# Patient Record
Sex: FEMALE | Race: WHITE | NOT HISPANIC OR LATINO | Employment: STUDENT | URBAN - METROPOLITAN AREA
[De-identification: names, ages, dates, MRNs, and addresses within clinical notes are randomized per-mention and may not be internally consistent; named-entity substitution may affect disease eponyms.]

---
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
## 2023-07-08 ENCOUNTER — HEALTH MAINTENANCE LETTER (OUTPATIENT)
Age: 18
End: 2023-07-08

## 2023-09-14 ENCOUNTER — TRANSFERRED RECORDS (OUTPATIENT)
Dept: HEALTH INFORMATION MANAGEMENT | Facility: CLINIC | Age: 18
End: 2023-09-14
Payer: COMMERCIAL

## 2023-10-04 ENCOUNTER — OFFICE VISIT (OUTPATIENT)
Dept: MIDWIFE SERVICES | Facility: CLINIC | Age: 18
End: 2023-10-04
Payer: COMMERCIAL

## 2023-10-04 VITALS — BODY MASS INDEX: 19.47 KG/M2 | DIASTOLIC BLOOD PRESSURE: 60 MMHG | SYSTOLIC BLOOD PRESSURE: 110 MMHG | WEIGHT: 129 LBS

## 2023-10-04 DIAGNOSIS — R39.15 URINARY URGENCY: Primary | ICD-10-CM

## 2023-10-04 DIAGNOSIS — N91.2 AMENORRHEA: ICD-10-CM

## 2023-10-04 LAB
CLUE CELLS: ABNORMAL
HCG UR QL: NEGATIVE
INTERNAL QC OK POCT: NORMAL
POCT KIT EXPIRATION DATE: NORMAL
POCT KIT LOT NUMBER: NORMAL
TRICHOMONAS, WET PREP: ABNORMAL
WBC'S/HIGH POWER FIELD, WET PREP: ABNORMAL
YEAST, WET PREP: ABNORMAL

## 2023-10-04 PROCEDURE — 87086 URINE CULTURE/COLONY COUNT: CPT | Performed by: ADVANCED PRACTICE MIDWIFE

## 2023-10-04 PROCEDURE — 81025 URINE PREGNANCY TEST: CPT | Performed by: ADVANCED PRACTICE MIDWIFE

## 2023-10-04 PROCEDURE — 87491 CHLMYD TRACH DNA AMP PROBE: CPT | Performed by: ADVANCED PRACTICE MIDWIFE

## 2023-10-04 PROCEDURE — 87591 N.GONORRHOEAE DNA AMP PROB: CPT | Performed by: ADVANCED PRACTICE MIDWIFE

## 2023-10-04 PROCEDURE — 87210 SMEAR WET MOUNT SALINE/INK: CPT | Performed by: ADVANCED PRACTICE MIDWIFE

## 2023-10-04 PROCEDURE — 99213 OFFICE O/P EST LOW 20 MIN: CPT | Performed by: ADVANCED PRACTICE MIDWIFE

## 2023-10-04 RX ORDER — HYDROXYZINE HYDROCHLORIDE 10 MG/1
1 TABLET, FILM COATED ORAL
COMMUNITY
Start: 2023-09-22

## 2023-10-04 NOTE — NURSING NOTE
"Chief Complaint   Patient presents with    UTI     Possible uti.  Has urgency then cannot go or only goes very little.  No pain, no burning.         Initial /60   Wt 58.5 kg (129 lb)   BMI 19.47 kg/m   Estimated body mass index is 19.47 kg/m  as calculated from the following:    Height as of 22: 1.734 m (5' 8.25\").    Weight as of this encounter: 58.5 kg (129 lb).  BP completed using cuff size: regular    Questioned patient about current smoking habits.  Pt. has never smoked.          Loni Cox LPN on 10/4/2023 at 9:47 AM                "

## 2023-10-04 NOTE — PROGRESS NOTES
SUBJECTIVE:                                                   Fabienne Sheehan is a 18 year old who presents to clinic today for the following health issue(s):  Patient presents with:  UTI: Possible uti.  Has urgency then cannot go or only goes very little.  No pain, no burning.        HPI:  Reports urinary urgency x 1 month. No itching or burning. Has an odor and heavy whitish yellow discharge. Abdominal pressure. Started eating disorder treatment in August. Has a history of PCOS.     No LMP recorded. (Menstrual status: Irregular Periods).  Menstrual History: irregular   Patient is not sexually active since middle August  .  Using condoms for contraception.   Health maintenance updated:  yes  STI infx testing offered:  Declined  PHQ-2 Score:         10/4/2023     9:47 AM 2021     9:07 AM   PHQ-2 (  [employer])   Q1: Little interest or pleasure in doing things 0 0   Q2: Feeling down, depressed or hopeless 0 0   PHQ-2 Score 0 0   PHQ-2 Total Score (12-17 Years)- Positive if 3 or more points; Administer PHQ-A if positive  0       Alcohol Score = Doesn't drink    Problem list and histories reviewed & adjusted, as indicated.  Additional history: as documented.    Patient Active Problem List   Diagnosis    History of concussion    Rupture of anterior cruciate ligament of left knee    Hip pain, right    Acne vulgaris    Polycystic ovarian syndrome     Past Surgical History:   Procedure Laterality Date    HIP SURGERY  2019    torn labrum      Social History     Tobacco Use    Smoking status: Never    Smokeless tobacco: Never   Substance Use Topics    Alcohol use: No     Alcohol/week: 0.0 standard drinks of alcohol      Problem (# of Occurrences) Relation (Name,Age of Onset)    Migraines (1) Mother    Other - See Comments (1) Mother: Bilat hip replacement    No Known Problems (7) Father, Maternal Grandmother, Maternal Grandfather, Paternal Grandmother, Paternal Grandfather, Brother, Brother               Current Outpatient Medications   Medication Sig    hydrOXYzine (ATARAX) 10 MG tablet Take 1 tablet by mouth 2 times daily (Patient not taking: Reported on 10/4/2023)     No current facility-administered medications for this visit.     No Known Allergies    ROS:  CONSTITUTIONAL: NEGATIVE for fever, chills, change in weight  INTEGUMENTARU/SKIN: NEGATIVE for worrisome rashes, moles or lesions  EYES: NEGATIVE for vision changes or irritation  ENT: NEGATIVE for ear, mouth and throat problems  RESP: NEGATIVE for significant cough or SOB  BREAST: NEGATIVE for masses, tenderness or discharge  CV: NEGATIVE for chest pain, palpitations or peripheral edema  GI: NEGATIVE for nausea, abdominal pain, heartburn, or change in bowel habits   female: menses: irregular, urgency, and whitish/yellowish discharge  MUSCULOSKELETAL: NEGATIVE for significant arthralgias or myalgia  NEURO: NEGATIVE for weakness, dizziness or paresthesias  PSYCHIATRIC: NEGATIVE for changes in mood or affect    OBJECTIVE:     /60   Wt 58.5 kg (129 lb)   BMI 19.47 kg/m    Body mass index is 19.47 kg/m .    PHYSICAL EXAM:  Constitutional:  Appearance: Well nourished, well developed alert, in no acute distress  Skin: General Inspection:  No rashes present, no lesions present, no areas of discoloration.  Neurologic:  Mental Status:  Oriented X3.  Normal strength and tone, sensory exam grossly normal, mentation intact and speech normal.    Psychiatric:  Mentation appears normal and affect normal/bright.  Pelvic Exam:  External Genitalia:     Normal appearance for age, no tenderness present, no inflammatory lesions present, color normal. Positive for thick white discharge  Vagina:     Normal vaginal vault without central or paravaginal defects, no discharge present, no inflammatory lesions present, no masses present  Bladder:     Nontender to palpation  Urethra:   Urethral Body:  Urethra palpation normal, urethra structural support normal   Urethral  Meatus:  No erythema or lesions present  Cervix:     Appearance healthy, no lesions present, nontender to palpation, no bleeding present  Uterus:     Uterus: firm, normal sized and nontender,   Adnexa:     No adnexal tenderness present, no adnexal masses present  Perineum:     Perineum within normal limits, no evidence of trauma, no rashes or skin lesions present  Anus:     Anus within normal limits, no hemorrhoids present  Inguinal Lymph Nodes:     No lymphadenopathy present  Pubic Hair:     Normal pubic hair distribution for age  Genitalia and Groin:     No rashes present, no lesions present, no areas of discoloration, no masses present     In-Clinic Test Results:  Results for orders placed or performed in visit on 10/04/23 (from the past 24 hour(s))   HCG qualitative urine POCT, Enter/Edit Result   Result Value Ref Range    HCG Qual Urine Negative Negative    Internal QC Check POCT Valid Valid    POCT Kit Lot Number [id number]     POCT Kit Expiration Date 07/25/2024        ASSESSMENT/PLAN:                                                        ICD-10-CM    1. Urinary urgency  R39.15 HCG qualitative urine POCT, Enter/Edit Result     NEISSERIA GONORRHOEA PCR     CHLAMYDIA TRACHOMATIS PCR     Urine Culture Aerobic Bacterial - lab collect     Wet prep - Clinic Collect      2. Amenorrhea  N91.2           PLAN:  Dicussed fertility (as pt would like to be pregnant at some point in her life)  Await results for treatment  If tests are negative, consider an US for abdominal pain.  LIZBETH Sagastume CNM

## 2023-10-05 LAB
C TRACH DNA SPEC QL NAA+PROBE: NEGATIVE
N GONORRHOEA DNA SPEC QL NAA+PROBE: NEGATIVE

## 2023-10-06 LAB — BACTERIA UR CULT: NO GROWTH

## 2023-10-26 ENCOUNTER — MYC MEDICAL ADVICE (OUTPATIENT)
Dept: MIDWIFE SERVICES | Facility: CLINIC | Age: 18
End: 2023-10-26
Payer: COMMERCIAL

## 2023-10-27 DIAGNOSIS — R39.15 URINARY URGENCY: Primary | ICD-10-CM

## 2023-10-27 NOTE — TELEPHONE ENCOUNTER
Please see mychart msg and advise. Pt still having urinary urgency despite negative tests and would like to know next steps.     Last OV: 10/4/23    BUBBA Langley

## 2024-05-23 ENCOUNTER — OFFICE VISIT (OUTPATIENT)
Dept: OBGYN | Facility: CLINIC | Age: 19
End: 2024-05-23
Payer: COMMERCIAL

## 2024-05-23 VITALS — BODY MASS INDEX: 20.07 KG/M2 | SYSTOLIC BLOOD PRESSURE: 90 MMHG | WEIGHT: 133 LBS | DIASTOLIC BLOOD PRESSURE: 62 MMHG

## 2024-05-23 DIAGNOSIS — Z12.4 PAP SMEAR FOR CERVICAL CANCER SCREENING: Primary | ICD-10-CM

## 2024-05-23 DIAGNOSIS — R10.2 PELVIC PAIN IN FEMALE: ICD-10-CM

## 2024-05-23 DIAGNOSIS — N92.6 IRREGULAR PERIODS: ICD-10-CM

## 2024-05-23 LAB
HBA1C MFR BLD: 4.2 % (ref 0–5.6)
HCG UR QL: NEGATIVE

## 2024-05-23 PROCEDURE — 81025 URINE PREGNANCY TEST: CPT | Performed by: OBSTETRICS & GYNECOLOGY

## 2024-05-23 PROCEDURE — 84146 ASSAY OF PROLACTIN: CPT | Performed by: OBSTETRICS & GYNECOLOGY

## 2024-05-23 PROCEDURE — 99459 PELVIC EXAMINATION: CPT | Performed by: OBSTETRICS & GYNECOLOGY

## 2024-05-23 PROCEDURE — G0145 SCR C/V CYTO,THINLAYER,RESCR: HCPCS | Performed by: OBSTETRICS & GYNECOLOGY

## 2024-05-23 PROCEDURE — G2211 COMPLEX E/M VISIT ADD ON: HCPCS | Performed by: OBSTETRICS & GYNECOLOGY

## 2024-05-23 PROCEDURE — 36415 COLL VENOUS BLD VENIPUNCTURE: CPT | Performed by: OBSTETRICS & GYNECOLOGY

## 2024-05-23 PROCEDURE — 99214 OFFICE O/P EST MOD 30 MIN: CPT | Performed by: OBSTETRICS & GYNECOLOGY

## 2024-05-23 PROCEDURE — 83036 HEMOGLOBIN GLYCOSYLATED A1C: CPT | Performed by: OBSTETRICS & GYNECOLOGY

## 2024-05-23 PROCEDURE — 84443 ASSAY THYROID STIM HORMONE: CPT | Performed by: OBSTETRICS & GYNECOLOGY

## 2024-05-23 NOTE — NURSING NOTE
"Chief Complaint   Patient presents with    Menstrual Problem     Irregular periods-vary in length-have gotten lighter, hot flashes-started in feb-mostly in afternoon, discomfort with laying on left side-thinks maybe ovarian cyst?       Initial BP 90/62   Wt 60.3 kg (133 lb)   LMP 2024   BMI 20.07 kg/m   Estimated body mass index is 20.07 kg/m  as calculated from the following:    Height as of 22: 1.734 m (5' 8.25\").    Weight as of this encounter: 60.3 kg (133 lb).  BP completed using cuff size: regular    Questioned patient about current smoking habits.  Pt. has never smoked.          The following HM Due: pap smear    "

## 2024-05-23 NOTE — PROGRESS NOTES
SUBJECTIVE:                                                     Fabienne Sheehan is a 19 year old female  who presents today for irregular periods, L sided pain, and questions about hormones and future fertility.    -Since Feb, hot flashes many afternoons.  -discomfort L side when lying down, crampy, deep. Hasn't gone away. Not present any other time. Occasional nausea with this.  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 Was coming monthly, but then no period since 2024. Small spotting once but nothing else.     Getting  at the end of the year, concerned about fertility.  Previous diagnosis of PCOS in , based on oligomenorrhea and US findings.  She was on oral contraceptive pills for a while, stopped those due to mood changes.      Problem list and histories reviewed & adjusted, as indicated.  Additional history: as documented.    Patient Active Problem List   Diagnosis    History of concussion    Rupture of anterior cruciate ligament of left knee    Hip pain, right    Acne vulgaris    Polycystic ovarian syndrome     Past Surgical History:   Procedure Laterality Date    HIP SURGERY  2019    torn labrum      Social History     Tobacco Use    Smoking status: Never    Smokeless tobacco: Never   Substance Use Topics    Alcohol use: No     Alcohol/week: 0.0 standard drinks of alcohol      Problem (# of Occurrences) Relation (Name,Age of Onset)    Migraines (1) Mother    Other - See Comments (1) Mother: Bilat hip replacement    No Known Problems (7) Father, Maternal Grandmother, Maternal Grandfather, Paternal Grandmother, Paternal Grandfather, Brother, Brother              Current Outpatient Medications   Medication Sig Dispense Refill    hydrOXYzine (ATARAX) 10 MG tablet Take 1 tablet by mouth 2 times daily      sertraline (ZOLOFT) 50 MG tablet        No current facility-administered medications for this visit.     No Known Allergies    ROS:  Negative other than as  noted in HPI.    OBJECTIVE:     Exam:  Constitutional:  Appearance: Well nourished, well developed alert, in no acute distress  Chest:  Respiratory Effort:  Breathing unlabored  Gastrointestinal:  Abdominal Examination:  Abdomen nontender to palpation, tone normal without rigidity or guarding, no masses present, umbilicus without lesions; Liver/Spleen:  No hepatomegaly present, liver nontender to palpation; Hernias:  No hernias present  Skin:General Inspection:  No rashes present, no lesions present, no areas of discoloration  Neurologic/Psychiatric:  Mental Status:  Oriented X3   Pelvic Exam:  External Genitalia:     Normal appearance for age, no discharge present, no tenderness present, no inflammatory lesions present, color normal  Vagina:     Normal vaginal vault without central or paravaginal defects, no discharge present, no inflammatory lesions present, no masses present  Bladder:     Nontender to palpation  Urethra:   Urethral Body:  Urethra palpation normal, urethra structural support normal   Urethral Meatus:  No erythema or lesions present  Cervix:     Appearance healthy, no lesions present, nontender to palpation, no bleeding present.. Pap obtained.  Uterus:     Uterus: firm, normal sized and nontender, anteverted in position.   Adnexa:     No adnexal tenderness present, no adnexal masses present  Perineum:     Perineum within normal limits, no evidence of trauma, no rashes or skin lesions present  Anus:     Anus within normal limits, no hemorrhoids present  Inguinal Lymph Nodes:     No lymphadenopathy present  Pubic Hair:     Normal pubic hair distribution for age  Genitalia and Groin:     No rashes present, no lesions present, no areas of discoloration, no masses present      In-Clinic Test Results:  Results for orders placed or performed in visit on 05/23/24 (from the past 24 hour(s))   Hemoglobin A1c   Result Value Ref Range    Hemoglobin A1C 4.2 0.0 - 5.6 %   HCG qualitative urine   Result Value Ref  Range    hCG Urine Qualitative Negative Negative       ASSESSMENT/PLAN:                                                        ICD-10-CM    1. Pap smear for cervical cancer screening  Z12.4 Pap Screen Only - Recommended Age 21 - 24 Years      2. Irregular periods  N92.6 Prolactin     TSH with free T4 reflex     Hemoglobin A1c     HCG qualitative urine     US Pelvic Transabdominal and Transvaginal     Prolactin     TSH with free T4 reflex     Hemoglobin A1c     HCG qualitative urine      3. Pelvic pain in female  R10.2 US Pelvic Transabdominal and Transvaginal            -Pap smear obtained today, results via [facility].  If normal, rescreen in 3 years.    -Discussed again her irregular cycles.  In the past, this was thought to be secondary to PCOS, but obviously history of an eating disorder complicates things as hypothalamic amenorrhea may also be the cause.  She is currently in recovery, weight has stabilized.  We discussed checking prolactin and TSH hCG and hemoglobin A1c today.  Will also schedule a pelvic ultrasound due to left-sided pain.  If these are normal and she has not had a period in the intervening timeframe, will use Provera 10 mg daily for 10 days to see if she gets a withdrawal bleed and proceed from there with timed labs.    -Left-sided pelvic pain, only when lying down on the left side.  Will obtain a pelvic ultrasound to rule out ovarian pathology, and if this is normal, consider referral to pelvic floor physical therapy.    The longitudinal plan of care for the diagnosis(es)/condition(s) as documented were addressed during this visit. Due to the added complexity in care, I will continue to support Fabienne in the subsequent management and with ongoing continuity of care.    Carley Perry MD  [facility]

## 2024-05-24 LAB
PROLACTIN SERPL 3RD IS-MCNC: 15 NG/ML (ref 5–23)
TSH SERPL DL<=0.005 MIU/L-ACNC: 0.71 UIU/ML (ref 0.3–4.2)

## 2024-05-30 LAB
BKR LAB AP GYN ADEQUACY: NORMAL
BKR LAB AP GYN INTERPRETATION: NORMAL
BKR LAB AP HPV REFLEX: NO
BKR LAB AP LMP: NORMAL
BKR LAB AP PREVIOUS ABNORMAL: NORMAL
PATH REPORT.COMMENTS IMP SPEC: NORMAL
PATH REPORT.COMMENTS IMP SPEC: NORMAL
PATH REPORT.RELEVANT HX SPEC: NORMAL

## 2024-06-03 ENCOUNTER — ANCILLARY PROCEDURE (OUTPATIENT)
Dept: ULTRASOUND IMAGING | Facility: CLINIC | Age: 19
End: 2024-06-03
Attending: OBSTETRICS & GYNECOLOGY
Payer: COMMERCIAL

## 2024-06-03 DIAGNOSIS — N92.6 IRREGULAR PERIODS: ICD-10-CM

## 2024-06-03 DIAGNOSIS — R10.2 PELVIC PAIN IN FEMALE: ICD-10-CM

## 2024-06-03 PROCEDURE — 76856 US EXAM PELVIC COMPLETE: CPT | Performed by: FAMILY MEDICINE

## 2024-06-03 PROCEDURE — 76830 TRANSVAGINAL US NON-OB: CPT | Performed by: FAMILY MEDICINE

## 2024-06-27 ENCOUNTER — OFFICE VISIT (OUTPATIENT)
Dept: OBGYN | Facility: CLINIC | Age: 19
End: 2024-06-27
Payer: COMMERCIAL

## 2024-06-27 VITALS
DIASTOLIC BLOOD PRESSURE: 60 MMHG | SYSTOLIC BLOOD PRESSURE: 90 MMHG | WEIGHT: 133 LBS | HEIGHT: 68 IN | BODY MASS INDEX: 20.16 KG/M2

## 2024-06-27 DIAGNOSIS — N92.6 IRREGULAR PERIODS: Primary | ICD-10-CM

## 2024-06-27 DIAGNOSIS — E28.2 PCOS (POLYCYSTIC OVARIAN SYNDROME): ICD-10-CM

## 2024-06-27 DIAGNOSIS — N91.2 AMENORRHEA: ICD-10-CM

## 2024-06-27 PROCEDURE — 99214 OFFICE O/P EST MOD 30 MIN: CPT | Performed by: OBSTETRICS & GYNECOLOGY

## 2024-06-27 RX ORDER — MEDROXYPROGESTERONE ACETATE 10 MG
10 TABLET ORAL DAILY
Qty: 10 TABLET | Refills: 0 | Status: SHIPPED | OUTPATIENT
Start: 2024-06-27

## 2024-06-27 NOTE — NURSING NOTE
"Chief Complaint   Patient presents with    Follow Up     U/S 6/3       Initial BP 90/60 (BP Location: Right arm, Patient Position: Chair, Cuff Size: Adult Regular)   Ht 1.727 m (5' 8\")   Wt 60.3 kg (133 lb)   LMP 2024 (Exact Date)   Breastfeeding No   BMI 20.22 kg/m   Estimated body mass index is 20.22 kg/m  as calculated from the following:    Height as of this encounter: 1.727 m (5' 8\").    Weight as of this encounter: 60.3 kg (133 lb).  BP completed using cuff size: regular    Questioned patient about current smoking habits.  Pt. has never smoked.          The following HM Due: NONE  Marina Aguayo CMA    "

## 2024-06-27 NOTE — PROGRESS NOTES
SUBJECTIVE:                                                     Fabienne Sheehan is a 19 year old female  who presents today for PCOS and irregular periods. See last note.    US reviewed: Shows evidence of PCOS. Labs within normal limits.  Would like to review next steps, and is interested in discussing fertility.  Ideally, would like to consider conceiving in the next year or 2.        Problem list and histories reviewed & adjusted, as indicated.  Additional history: as documented.    Patient Active Problem List   Diagnosis    History of concussion    Rupture of anterior cruciate ligament of left knee    Hip pain, right    Acne vulgaris    Polycystic ovarian syndrome     Past Surgical History:   Procedure Laterality Date    HIP SURGERY  2019    torn labrum      Social History     Tobacco Use    Smoking status: Never    Smokeless tobacco: Never   Substance Use Topics    Alcohol use: No     Alcohol/week: 0.0 standard drinks of alcohol      Problem (# of Occurrences) Relation (Name,Age of Onset)    Migraines (1) Mother    Other - See Comments (1) Mother: Bilat hip replacement    No Known Problems (7) Father, Maternal Grandmother, Maternal Grandfather, Paternal Grandmother, Paternal Grandfather, Brother, Brother              Current Outpatient Medications   Medication Sig Dispense Refill    hydrOXYzine (ATARAX) 10 MG tablet Take 1 tablet by mouth 2 times daily      medroxyPROGESTERone (PROVERA) 10 MG tablet Take 1 tablet (10 mg) by mouth daily 10 tablet 0    sertraline (ZOLOFT) 50 MG tablet        No current facility-administered medications for this visit.     No Known Allergies    ROS:  Negative other than as noted in HPI.    OBJECTIVE:     Exam:  Constitutional:  Appearance: Well nourished, well developed alert, in no acute distress  Neurologic/Psychiatric:  Mental Status:  Oriented X3       In-Clinic Test Results:  No results found for this or any previous visit (from the past 24  hour(s)).    ASSESSMENT/PLAN:                                                        ICD-10-CM    1. Irregular periods  N92.6       2. Amenorrhea  N91.2 medroxyPROGESTERone (PROVERA) 10 MG tablet     Follicle stimulating hormone     Estradiol      3. PCOS (polycystic ovarian syndrome)  E28.2             -Discussed polycystic ovarian syndrome. Explained that polycystic ovary syndrome (PCOS) is an important cause of both menstrual irregularity and androgen excess in women. Discussed that it can cause irregular menstrual cycles, hirsutism, obesity, insulin resistance, and anovulatory infertility. Specifically explained that due to PCOS she may be at increased risk of type 2 diabetes, cardiovascular disease, endometrial hyperplasia, endometrial cancer, and infertility.   Discussed that oral contraceptives (OCs) are the mainstay of pharmacologic therapy for women with PCOS for managing hyperandrogenism, treating menstrual dysfunction and protecting endometrial lining. Additionally, these provide contraception.   For women with PCOS who choose not to or cannot take OCs, we discussed alternative treatments for endometrial protection including intermittent or continuous progestin therapy, or a progestin-releasing intrauterine device (IUD). Depo provera, Nuvaring, the patch, and Nexplanon may also provide this protection in addition to contraception.  -Given her history of an eating disorder, it is also possible that there is an component of hypothalamic hypogonadotropic amenorrhea.  In order to elucidate the difference and know how to best treat her, I have recommended a progesterone withdrawal.  She is interested in this and Provera was sent.  If she has a period after using Provera, will check day 3 labs, which were ordered today.  She can then have the option of starting oral contraceptives as above, or using Provera to withdrawal every 45 to 60 days if she does not get a period, in order to protect the endometrial  lining.  This was discussed in detail today and would not require a follow-up visit unless she had questions.    If she does not get a withdrawal bleed, would then proceed to give her estrogen and Provera and I would give her the instructions to use this.  If she got a period with this regimen, this is most likely hypothalamic amenorrhea, and she is at risk for bone loss.  I would then need to schedule a visit with her to discuss options for estrogen replacement.      Carley Perry MD  [facility]

## 2024-06-30 ENCOUNTER — LAB (OUTPATIENT)
Dept: LAB | Facility: CLINIC | Age: 19
End: 2024-06-30
Payer: COMMERCIAL

## 2024-06-30 DIAGNOSIS — N91.2 AMENORRHEA: ICD-10-CM

## 2024-06-30 LAB
ESTRADIOL SERPL-MCNC: 36 PG/ML
FSH SERPL IRP2-ACNC: 5.9 MIU/ML

## 2024-06-30 PROCEDURE — 82670 ASSAY OF TOTAL ESTRADIOL: CPT

## 2024-06-30 PROCEDURE — 36415 COLL VENOUS BLD VENIPUNCTURE: CPT

## 2024-06-30 PROCEDURE — 83001 ASSAY OF GONADOTROPIN (FSH): CPT

## 2024-08-13 ENCOUNTER — OFFICE VISIT (OUTPATIENT)
Dept: URGENT CARE | Facility: URGENT CARE | Age: 19
End: 2024-08-13
Payer: COMMERCIAL

## 2024-08-13 VITALS
DIASTOLIC BLOOD PRESSURE: 60 MMHG | TEMPERATURE: 98.6 F | SYSTOLIC BLOOD PRESSURE: 90 MMHG | HEART RATE: 75 BPM | WEIGHT: 130 LBS | OXYGEN SATURATION: 97 % | BODY MASS INDEX: 19.77 KG/M2

## 2024-08-13 DIAGNOSIS — R39.15 URINARY URGENCY: Primary | ICD-10-CM

## 2024-08-13 LAB
ALBUMIN UR-MCNC: NEGATIVE MG/DL
APPEARANCE UR: CLEAR
BILIRUB UR QL STRIP: NEGATIVE
CLUE CELLS: ABNORMAL
COLOR UR AUTO: YELLOW
GLUCOSE UR STRIP-MCNC: NEGATIVE MG/DL
HGB UR QL STRIP: NEGATIVE
KETONES UR STRIP-MCNC: NEGATIVE MG/DL
LEUKOCYTE ESTERASE UR QL STRIP: NEGATIVE
NITRATE UR QL: NEGATIVE
PH UR STRIP: 6.5 [PH] (ref 5–7)
SP GR UR STRIP: 1.02 (ref 1–1.03)
TRICHOMONAS, WET PREP: ABNORMAL
UROBILINOGEN UR STRIP-ACNC: 1 E.U./DL
WBC'S/HIGH POWER FIELD, WET PREP: ABNORMAL
YEAST, WET PREP: ABNORMAL

## 2024-08-13 PROCEDURE — 99203 OFFICE O/P NEW LOW 30 MIN: CPT | Performed by: PHYSICIAN ASSISTANT

## 2024-08-13 PROCEDURE — 81003 URINALYSIS AUTO W/O SCOPE: CPT

## 2024-08-13 PROCEDURE — 87210 SMEAR WET MOUNT SALINE/INK: CPT

## 2024-08-14 NOTE — PATIENT INSTRUCTIONS
Try Azo available over the counter for symptomatic relief    Make sure to push fluids    Probiotics also recommended  
Yes

## 2024-08-14 NOTE — PROGRESS NOTES
Assessment & Plan     Urinary urgency  Acute problem.  Onset this morning.  On exam patient is in no acute distress.  Vitals are stable.  Urinalysis today is unremarkable.  No evidence of UTI.  Wet prep is negative.  Recommended Azo OTC for symptomatic relief.  Recommended to push fluids.  Probiotics.  Advised to keep monitoring symptoms.  Follow-up if any worsening symptoms.  Patient agrees with the plan.  - UA Macroscopic with reflex to Microscopic and Culture - Lab Collect  - Wet prep - lab collect       Return in about 1 week (around 8/20/2024) for Symptoms failing to improve.    Maryjane Craig PA-C  [facility]RG[facility]     Fabienne is a 19 year old female who presents to clinic today for the following health issues:  Chief Complaint   Patient presents with    Urgent Care     This morning urgency, not able to urinate sometimes, urinating small amounts, getting worse, bloated, vaginal itching, odor,   No meds taking for this        HPI    Patient with medical history significant for PCOS is presenting to urgent care today with a complaint of urinary urgency, urinary hesitancy, slight vaginal itching since this morning.  She notes she has felt warm this afternoon.  No recorded fevers.  No vomiting or diarrhea.  No abnormal vaginal discharge.  No abdominal pain.   Patient denies any concerns for STDs.      Review of Systems  Constitutional, HEENT, cardiovascular, pulmonary, GI, , musculoskeletal, neuro, skin, endocrine and psych systems are negative, except as otherwise noted.      Objective    BP 90/60   Pulse 75   Temp 98.6  F (37  C) (Tympanic)   Wt 59 kg (130 lb)   LMP 03/20/2024 (Exact Date)   SpO2 97%   BMI 19.77 kg/m    Physical Exam   GENERAL: alert and no distress  RESP: lungs clear to auscultation - no rales, rhonchi or wheezes  CV: regular rate and rhythm, normal S1 S2  ABDOMEN: soft, nontender, no hepatosplenomegaly, no masses and bowel sounds normal  MS: no  gross musculoskeletal defects noted, no edema    Results for orders placed or performed in visit on 08/13/24 (from the past 24 hour(s))   UA Macroscopic with reflex to Microscopic and Culture - Lab Collect    Specimen: Urine, Midstream   Result Value Ref Range    Color Urine Yellow Colorless, Straw, Light Yellow, Yellow    Appearance Urine Clear Clear    Glucose Urine Negative Negative mg/dL    Bilirubin Urine Negative Negative    Ketones Urine Negative Negative mg/dL    Specific Gravity Urine 1.020 1.003 - 1.035    Blood Urine Negative Negative    pH Urine 6.5 5.0 - 7.0    Protein Albumin Urine Negative Negative mg/dL    Urobilinogen Urine 1.0 0.2, 1.0 E.U./dL    Nitrite Urine Negative Negative    Leukocyte Esterase Urine Negative Negative    Narrative    Microscopic not indicated   Wet prep - lab collect    Specimen: Vagina; Swab   Result Value Ref Range    Trichomonas Absent Absent    Yeast Absent Absent    Clue Cells Absent Absent    WBCs/high power field 1+ (A) None

## 2024-08-31 ENCOUNTER — HEALTH MAINTENANCE LETTER (OUTPATIENT)
Age: 19
End: 2024-08-31

## 2024-10-29 ENCOUNTER — LAB (OUTPATIENT)
Dept: LAB | Facility: CLINIC | Age: 19
End: 2024-10-29
Payer: COMMERCIAL

## 2024-10-29 ENCOUNTER — MYC MEDICAL ADVICE (OUTPATIENT)
Dept: OBGYN | Facility: CLINIC | Age: 19
End: 2024-10-29

## 2024-10-29 ENCOUNTER — ANCILLARY PROCEDURE (OUTPATIENT)
Dept: ULTRASOUND IMAGING | Facility: CLINIC | Age: 19
End: 2024-10-29
Attending: FAMILY MEDICINE
Payer: COMMERCIAL

## 2024-10-29 DIAGNOSIS — R10.2 PELVIC PAIN IN FEMALE: ICD-10-CM

## 2024-10-29 DIAGNOSIS — R10.2 PELVIC PAIN IN FEMALE: Primary | ICD-10-CM

## 2024-10-29 LAB — HCG SERPL QL: NEGATIVE

## 2024-10-29 PROCEDURE — 84703 CHORIONIC GONADOTROPIN ASSAY: CPT

## 2024-10-29 PROCEDURE — 76856 US EXAM PELVIC COMPLETE: CPT | Performed by: OBSTETRICS & GYNECOLOGY

## 2024-10-29 PROCEDURE — 76830 TRANSVAGINAL US NON-OB: CPT | Performed by: OBSTETRICS & GYNECOLOGY

## 2024-10-29 PROCEDURE — 36415 COLL VENOUS BLD VENIPUNCTURE: CPT

## 2024-10-29 NOTE — TELEPHONE ENCOUNTER
Spoke to pt.  LMP 9/24/24. She has hx of irregular periods (PCOS).   Not on birth control but uses condoms.     Pelvic pain since last week.  Had one night of severe cramping. Lasted only a few minutes. Dull ache since.  Pain spiked again last night, but otherwise dull.  No vaginal bleeding.    Advised to take HPT today, call if positive.  scheduled for pelvic U/s today and f/up tomorrow.    Gave instruction on when to go to ED.       Radha VALDEZ RN BSN  [facility]

## 2024-10-30 ENCOUNTER — OFFICE VISIT (OUTPATIENT)
Dept: OBGYN | Facility: CLINIC | Age: 19
End: 2024-10-30
Payer: COMMERCIAL

## 2024-10-30 VITALS
BODY MASS INDEX: 20.63 KG/M2 | WEIGHT: 136.1 LBS | SYSTOLIC BLOOD PRESSURE: 90 MMHG | DIASTOLIC BLOOD PRESSURE: 60 MMHG | HEIGHT: 68 IN

## 2024-10-30 DIAGNOSIS — E28.2 PCOS (POLYCYSTIC OVARIAN SYNDROME): ICD-10-CM

## 2024-10-30 DIAGNOSIS — N83.209 RUPTURED OVARIAN CYST: Primary | ICD-10-CM

## 2024-10-30 PROCEDURE — 36415 COLL VENOUS BLD VENIPUNCTURE: CPT | Performed by: FAMILY MEDICINE

## 2024-10-30 PROCEDURE — 84403 ASSAY OF TOTAL TESTOSTERONE: CPT | Performed by: FAMILY MEDICINE

## 2024-10-30 PROCEDURE — 99214 OFFICE O/P EST MOD 30 MIN: CPT | Performed by: FAMILY MEDICINE

## 2024-10-30 PROCEDURE — 83001 ASSAY OF GONADOTROPIN (FSH): CPT | Performed by: FAMILY MEDICINE

## 2024-10-30 PROCEDURE — 84144 ASSAY OF PROGESTERONE: CPT | Performed by: FAMILY MEDICINE

## 2024-10-30 PROCEDURE — 82627 DEHYDROEPIANDROSTERONE: CPT | Performed by: FAMILY MEDICINE

## 2024-10-30 PROCEDURE — 82166 ASSAY ANTI-MULLERIAN HORM: CPT | Performed by: FAMILY MEDICINE

## 2024-10-30 PROCEDURE — 83002 ASSAY OF GONADOTROPIN (LH): CPT | Performed by: FAMILY MEDICINE

## 2024-10-30 PROCEDURE — 99000 SPECIMEN HANDLING OFFICE-LAB: CPT | Performed by: FAMILY MEDICINE

## 2024-10-30 PROCEDURE — 82670 ASSAY OF TOTAL ESTRADIOL: CPT | Performed by: FAMILY MEDICINE

## 2024-10-30 PROCEDURE — 82157 ASSAY OF ANDROSTENEDIONE: CPT | Mod: 90 | Performed by: FAMILY MEDICINE

## 2024-10-30 RX ORDER — KETOROLAC TROMETHAMINE 10 MG/1
10 TABLET, FILM COATED ORAL EVERY 6 HOURS PRN
Qty: 20 TABLET | Refills: 0 | Status: SHIPPED | OUTPATIENT
Start: 2024-10-30

## 2024-10-30 RX ORDER — OXYCODONE HCL 5 MG/5 ML
5 SOLUTION, ORAL ORAL 2 TIMES DAILY PRN
Qty: 60 ML | Refills: 0 | Status: CANCELLED | OUTPATIENT
Start: 2024-10-30

## 2024-10-30 NOTE — PROGRESS NOTES
SUBJECTIVE:  Fabienne Sheehan is an 19 year old   woman who presents for   gynecology consult for new onset pelvic pain, hx of PCOS, had never felt this pain before,   Us showing fluid in posterior cul-de-sac, likely from ruptured ovarian cyst.  Pregnancy test was negative.    No LMP recorded. (Menstrual status: Irregular Periods). Periods are regular q 4-6 weeks, lasting   4 days. Menarche @ age teenager, Dysmenorrhea:none. Cyclic symptoms   include none. No intermenstrual bleeding,   spotting, or discharge.    Current contraception: condoms  History of abnormal Pap smear: No  Family history of uterine or ovarian cancer: No  History of abnormal mammogram: No  Family history of breast cancer: Yes: M Aunt        Past Medical History:   Diagnosis Date    Clavicle fracture     Concussion with loss of consciousness     Approx 18 mos    Constipation      Miralax;  Enulose    Sever's disease     Right  foot          Family History   Problem Relation Age of Onset    Other - See Comments Mother         Bilat hip replacement    Migraines Mother     No Known Problems Father     No Known Problems Maternal Grandmother     No Known Problems Maternal Grandfather     No Known Problems Paternal Grandmother     No Known Problems Paternal Grandfather     No Known Problems Brother     No Known Problems Brother        Past Surgical History:   Procedure Laterality Date    HIP SURGERY  2019    torn labrum       Current Outpatient Medications   Medication Sig Dispense Refill    hydrOXYzine (ATARAX) 10 MG tablet Take 1 tablet by mouth 2 times daily (Patient not taking: Reported on 10/30/2024)      medroxyPROGESTERone (PROVERA) 10 MG tablet Take 1 tablet (10 mg) by mouth daily (Patient not taking: Reported on 2024) 10 tablet 0    sertraline (ZOLOFT) 50 MG tablet  (Patient not taking: Reported on 10/30/2024)       No current facility-administered medications for this visit.     No Known Allergies    Social  "History     Tobacco Use    Smoking status: Never    Smokeless tobacco: Never   Substance Use Topics    Alcohol use: No     Alcohol/week: 0.0 standard drinks of alcohol       Review Of Systems  Ears/Nose/Throat: negative  Respiratory: No shortness of breath, dyspnea on exertion, cough, or hemoptysis  Cardiovascular: negative  Gastrointestinal: negative  Genitourinary: See HPI   Constitutional, HEENT, cardiovascular, pulmonary, GI, , musculoskeletal, neuro, skin, endocrine and psych systems are negative, except as otherwise noted.    OBJECTIVE:  BP 90/60   Ht 1.727 m (5' 8\")   Wt 61.7 kg (136 lb 1.6 oz)   BMI 20.69 kg/m    General appearance: healthy, alert, and no distress  Skin: Skin color, texture, turgor normal. No rashes or lesions.  Ears: negative  Nose/Sinuses: Nares normal. Septum midline. Mucosa normal. No drainage or sinus tenderness.  Oropharynx: Lips, mucosa, and tongue normal. Teeth and gums normal.  Neck: Neck supple. No adenopathy. Thyroid symmetric, normal size,, Carotids without bruits.  Lungs: negative, Percussion normal. Good diaphragmatic excursion. Lungs clear  Heart: negative, PMI normal. No lifts, heaves, or thrills. RRR. No murmurs, clicks gallops or rub  Breasts: deferred  Abdomen: Abdomen soft, non-tender. BS normal. No masses, organomegaly    ASSESSMENT:  Fabienne Sheehan is an 19 year old   woman who presents for   gynecology consult for new onset pelvic pain, hx of PCOS, had never felt this pain before,   Us showing fluid in posterior cul-de-sac, likely from ruptured ovarian cyst.    Pain started about a week ago and hasn't resolved.     PLAN:  Dx:  1)  Pelvic pain, sudden onset with hemorrhagic fluid in posterior cul-de-sac:  ruptured ovarian cyst   Discussed observation, treatment with  pain medication and laparoscopy,   If no resolution by 3 weeks, recommend diagnostic laparoscopy      2) PCOS on ultrasound with irregular cycles:  Discussed fertility planning, planning to "   Become pregnant in the next few months. Could try luis-inositol.   Discussed clomid/femara, IUI as needed        Labs were reviewed in Epic   Imaging was reviewed in Epic   Tests and documents were reviewed.   Discussion of management or test interpretation   Diagnosis or treatment significantly limited by social determinant          Dr. Ernestine Terry,     Obstetrics and Gynecology  [facility] [place]

## 2024-10-30 NOTE — NURSING NOTE
"Chief Complaint   Patient presents with    Pelvic Pain     Bad pain this week--shooting pain then dull ache--flares with sharp pain       Initial BP 90/60   Ht 1.727 m (5' 8\")   Wt 61.7 kg (136 lb 1.6 oz)   BMI 20.69 kg/m   Estimated body mass index is 20.69 kg/m  as calculated from the following:    Height as of this encounter: 1.727 m (5' 8\").    Weight as of this encounter: 61.7 kg (136 lb 1.6 oz).  BP completed using cuff size: regular    Questioned patient about current smoking habits.  Pt. has never smoked.          The following HM Due: NONE    "

## 2024-10-30 NOTE — PATIENT INSTRUCTIONS
Ricky-inositol- PCOS     It start with the egg     Labs today     Dr. Ernestine Terry, DO    Obstetrics and Gynecology  [facility] - [place] and [place]

## 2024-10-31 LAB
DHEA-S SERPL-MCNC: 359 UG/DL (ref 35–430)
ESTRADIOL SERPL-MCNC: 23 PG/ML
FSH SERPL IRP2-ACNC: 5.8 MIU/ML
LH SERPL-ACNC: 7.4 MIU/ML
MIS SERPL-MCNC: 7.7 NG/ML (ref 1.2–12)
PROGEST SERPL-MCNC: 0.3 NG/ML

## 2024-11-01 LAB — TESTOST SERPL-MCNC: 20 NG/DL (ref 8–60)

## 2024-11-03 LAB — ANDROST SERPL-MCNC: 0.92 NG/ML

## 2025-02-17 ENCOUNTER — VIRTUAL VISIT (OUTPATIENT)
Dept: OBGYN | Facility: CLINIC | Age: 20
End: 2025-02-17
Payer: COMMERCIAL

## 2025-02-17 DIAGNOSIS — Z34.01 ENCOUNTER FOR SUPERVISION OF NORMAL FIRST PREGNANCY IN FIRST TRIMESTER: Primary | ICD-10-CM

## 2025-02-17 DIAGNOSIS — Z32.01 PREGNANCY TEST POSITIVE: ICD-10-CM

## 2025-02-17 PROCEDURE — 99207 PR NO CHARGE NURSE ONLY: CPT

## 2025-02-17 RX ORDER — PRENATAL VIT/IRON FUM/FOLIC AC 27MG-0.8MG
1 TABLET ORAL DAILY
Qty: 90 TABLET | Status: SHIPPED
Start: 2025-02-17

## 2025-02-17 NOTE — PROGRESS NOTES
"Chief Complaint   Patient presents with    Prenatal Care     New Prenatal Nurse Telephone Visit       Initial LMP 2025 (Exact Date)  Estimated body mass index is 20.69 kg/m  as calculated from the following:    Height as of 10/30/24: 1.727 m (5' 8\").    Weight as of 10/30/24: 61.7 kg (136 lb 1.6 oz).  BP completed using cuff size: NA (Not Taken)    Questioned patient about current smoking habits.  Pt. has never smoked.          NPN nurse visit done over the phone. Pt will be given NPN folder and book at her upcoming appt.  Discussed optional screening available to assess chromosomal anomalies. Questions answered. Informed pt of the clinic structure (on-call does deliveries for the day, may be male or female doctor). Pt advised to call the clinic if she has any questions or concerns related to her pregnancy. Prenatal labs will be obtained at her upcoming appt. New prenatal visit scheduled on 3/12/252 with   Dr Moon/but will be seeing Dr Perry in the fuure.    6w0d    Menstrual cycles: 27-46 days  Date of positive pregnancy test: 25  Medications stopped upon pos HPT: none    Last pap: 24        Patient supplied answers from flow sheet for:  Prenatal OB Questionnaire.  Past Medical History  Have you ever recieved care for your mental health? : (Patient-Rptd) No  Have you ever been in a major accident or suffered serious trauma?: (Patient-Rptd) No  Within the last year, has anyone hit, slapped, kicked or otherwise hurt you?: (Patient-Rptd) No  In the last year, has anyone forced you to have sex when you didn't want to?: (Patient-Rptd) No    Past Medical History 2   Have you ever received a blood transfusion?: (Patient-Rptd) No  Would you accept a blood transfusion if was medically recommended?: Yes  Does anyone in your home smoke?: (Patient-Rptd) No   Is your blood type Rh negative?: (Patient-Rptd) No  Have you ever ?: (Patient-Rptd) No  Have you been hospitalized for a nonsurgical " reason excluding normal delivery?: (Patient-Rptd) No  Have you ever had an abnormal pap smear?: (Patient-Rptd) No    Past Medical History (Continued)  Do you have a history of abnormalities of the uterus?: (Patient-Rptd) No  Did your mother take ALEXANDRA or any other hormones when she was pregnant with you?: (Patient-Rptd) No  Do you have any other problems we have not asked about which you feel may be important to this pregnancy?: (Patient-Rptd) No     Mahnaz Moore RN

## 2025-02-23 LAB
ABO + RH BLD: NORMAL
BLD GP AB SCN SERPL QL: NEGATIVE
SPECIMEN EXP DATE BLD: NORMAL

## 2025-02-24 ENCOUNTER — TELEPHONE (OUTPATIENT)
Dept: OBGYN | Facility: CLINIC | Age: 20
End: 2025-02-24

## 2025-02-24 ENCOUNTER — LAB (OUTPATIENT)
Dept: LAB | Facility: CLINIC | Age: 20
End: 2025-02-24
Payer: COMMERCIAL

## 2025-02-24 ENCOUNTER — MYC MEDICAL ADVICE (OUTPATIENT)
Dept: OBGYN | Facility: CLINIC | Age: 20
End: 2025-02-24

## 2025-02-24 ENCOUNTER — ANCILLARY PROCEDURE (OUTPATIENT)
Dept: ULTRASOUND IMAGING | Facility: CLINIC | Age: 20
End: 2025-02-24
Payer: COMMERCIAL

## 2025-02-24 DIAGNOSIS — Z34.01 ENCOUNTER FOR SUPERVISION OF NORMAL FIRST PREGNANCY IN FIRST TRIMESTER: ICD-10-CM

## 2025-02-24 DIAGNOSIS — O26.899 ABDOMINAL CRAMPING AFFECTING PREGNANCY: ICD-10-CM

## 2025-02-24 DIAGNOSIS — Z32.01 PREGNANCY TEST POSITIVE: Primary | ICD-10-CM

## 2025-02-24 DIAGNOSIS — R10.9 ABDOMINAL CRAMPING AFFECTING PREGNANCY: ICD-10-CM

## 2025-02-24 DIAGNOSIS — Z32.01 PREGNANCY TEST POSITIVE: ICD-10-CM

## 2025-02-24 LAB
ERYTHROCYTE [DISTWIDTH] IN BLOOD BY AUTOMATED COUNT: 12.9 % (ref 10–15)
EST. AVERAGE GLUCOSE BLD GHB EST-MCNC: 77 MG/DL
HBA1C MFR BLD: 4.3 % (ref 0–5.6)
HCG INTACT+B SERPL-ACNC: 8086 MIU/ML
HCT VFR BLD AUTO: 38.5 % (ref 35–47)
HGB BLD-MCNC: 14 G/DL (ref 11.7–15.7)
MCH RBC QN AUTO: 28.9 PG (ref 26.5–33)
MCHC RBC AUTO-ENTMCNC: 36.4 G/DL (ref 31.5–36.5)
MCV RBC AUTO: 80 FL (ref 78–100)
PLATELET # BLD AUTO: 255 10E3/UL (ref 150–450)
RBC # BLD AUTO: 4.84 10E6/UL (ref 3.8–5.2)
RUBV IGG SERPL QL IA: 2.13 INDEX
RUBV IGG SERPL QL IA: POSITIVE
T PALLIDUM AB SER QL: NONREACTIVE
WBC # BLD AUTO: 14.4 10E3/UL (ref 4–11)

## 2025-02-24 PROCEDURE — 86780 TREPONEMA PALLIDUM: CPT

## 2025-02-24 PROCEDURE — 86762 RUBELLA ANTIBODY: CPT

## 2025-02-24 PROCEDURE — 87389 HIV-1 AG W/HIV-1&-2 AB AG IA: CPT

## 2025-02-24 PROCEDURE — 87086 URINE CULTURE/COLONY COUNT: CPT

## 2025-02-24 PROCEDURE — 84702 CHORIONIC GONADOTROPIN TEST: CPT

## 2025-02-24 PROCEDURE — 85027 COMPLETE CBC AUTOMATED: CPT

## 2025-02-24 PROCEDURE — 86850 RBC ANTIBODY SCREEN: CPT

## 2025-02-24 PROCEDURE — 76817 TRANSVAGINAL US OBSTETRIC: CPT | Performed by: FAMILY MEDICINE

## 2025-02-24 PROCEDURE — 83036 HEMOGLOBIN GLYCOSYLATED A1C: CPT

## 2025-02-24 PROCEDURE — 76801 OB US < 14 WKS SINGLE FETUS: CPT | Performed by: FAMILY MEDICINE

## 2025-02-24 PROCEDURE — 36415 COLL VENOUS BLD VENIPUNCTURE: CPT

## 2025-02-24 PROCEDURE — 86803 HEPATITIS C AB TEST: CPT

## 2025-02-24 PROCEDURE — 86901 BLOOD TYPING SEROLOGIC RH(D): CPT

## 2025-02-24 PROCEDURE — 87340 HEPATITIS B SURFACE AG IA: CPT

## 2025-02-24 PROCEDURE — 86900 BLOOD TYPING SEROLOGIC ABO: CPT

## 2025-02-24 NOTE — TELEPHONE ENCOUNTER
Had US today for abd cramping  7w0d by LMP    Call to pt to review US findings and provider recommendations.     Orders entered for stat serial HCGs. One added to todays labs and appointment made for 2/26.    Follow up ultrasound made for 3/10, 14 days after first US as recommended by reading provider.     Pt instructed to notify care team with any new symptoms or if any vaginal bleeding.     First OB appt with Dr Moon on 3/12.    Valentina TOSCANO RN  OB/GYN [place]

## 2025-02-24 NOTE — TELEPHONE ENCOUNTER
Call to pt.   7w0d        Pt having cramps over the last week.   Yesterday evening cramps were much more persistent.   Worse with ambulation. Feels like strong ache.   Denies any bleeding or vaginal discharge   Denies any vaginal pain or burning.   Has had small amount of itching put pt feels it is dryness. Denies any discharge or yeast infections concerns.   Denies any one sided pain.    US scheduled for today. Labs appts moved up as well. Pt advised that if we feel we are not seeing everything we would expect at this stage that we will call to review follow up instructions or care plan.     Reviewed bleeding and ED precautions.     Valentina TOSCANO RN  OB/GYN [place]

## 2025-02-25 LAB
BACTERIA UR CULT: NORMAL
HBV SURFACE AG SERPL QL IA: NONREACTIVE
HCV AB SERPL QL IA: NONREACTIVE
HIV 1+2 AB+HIV1 P24 AG SERPL QL IA: NONREACTIVE

## 2025-02-26 ENCOUNTER — LAB (OUTPATIENT)
Dept: LAB | Facility: CLINIC | Age: 20
End: 2025-02-26
Payer: COMMERCIAL

## 2025-02-26 DIAGNOSIS — Z32.01 PREGNANCY TEST POSITIVE: ICD-10-CM

## 2025-02-26 LAB — HCG INTACT+B SERPL-ACNC: ABNORMAL MIU/ML

## 2025-02-26 PROCEDURE — 84702 CHORIONIC GONADOTROPIN TEST: CPT

## 2025-02-26 PROCEDURE — 36415 COLL VENOUS BLD VENIPUNCTURE: CPT

## 2025-03-10 ENCOUNTER — ANCILLARY PROCEDURE (OUTPATIENT)
Dept: ULTRASOUND IMAGING | Facility: CLINIC | Age: 20
End: 2025-03-10
Payer: COMMERCIAL

## 2025-03-10 DIAGNOSIS — R10.9 ABDOMINAL CRAMPING AFFECTING PREGNANCY: ICD-10-CM

## 2025-03-10 DIAGNOSIS — Z32.01 PREGNANCY TEST POSITIVE: ICD-10-CM

## 2025-03-10 DIAGNOSIS — O26.899 ABDOMINAL CRAMPING AFFECTING PREGNANCY: ICD-10-CM

## 2025-03-10 PROCEDURE — 76801 OB US < 14 WKS SINGLE FETUS: CPT | Performed by: FAMILY MEDICINE

## 2025-03-10 PROCEDURE — 76817 TRANSVAGINAL US OBSTETRIC: CPT | Performed by: FAMILY MEDICINE

## 2025-03-12 ENCOUNTER — PRENATAL OFFICE VISIT (OUTPATIENT)
Dept: OBGYN | Facility: CLINIC | Age: 20
End: 2025-03-12
Payer: COMMERCIAL

## 2025-03-12 VITALS — SYSTOLIC BLOOD PRESSURE: 92 MMHG | DIASTOLIC BLOOD PRESSURE: 62 MMHG | WEIGHT: 132.1 LBS | BODY MASS INDEX: 20.09 KG/M2

## 2025-03-12 DIAGNOSIS — Z11.3 SCREEN FOR STD (SEXUALLY TRANSMITTED DISEASE): ICD-10-CM

## 2025-03-12 DIAGNOSIS — O21.9 NAUSEA AND VOMITING DURING PREGNANCY: ICD-10-CM

## 2025-03-12 DIAGNOSIS — Z34.01 ENCOUNTER FOR SUPERVISION OF NORMAL FIRST PREGNANCY IN FIRST TRIMESTER: Primary | ICD-10-CM

## 2025-03-12 PROBLEM — Z34.90 SUPERVISION OF NORMAL PREGNANCY: Status: ACTIVE | Noted: 2025-03-12

## 2025-03-12 PROCEDURE — 3078F DIAST BP <80 MM HG: CPT | Performed by: OBSTETRICS & GYNECOLOGY

## 2025-03-12 PROCEDURE — 99459 PELVIC EXAMINATION: CPT | Performed by: OBSTETRICS & GYNECOLOGY

## 2025-03-12 PROCEDURE — 3074F SYST BP LT 130 MM HG: CPT | Performed by: OBSTETRICS & GYNECOLOGY

## 2025-03-12 PROCEDURE — 0500F INITIAL PRENATAL CARE VISIT: CPT | Performed by: OBSTETRICS & GYNECOLOGY

## 2025-03-12 PROCEDURE — 99214 OFFICE O/P EST MOD 30 MIN: CPT | Performed by: OBSTETRICS & GYNECOLOGY

## 2025-03-12 PROCEDURE — G2211 COMPLEX E/M VISIT ADD ON: HCPCS | Performed by: OBSTETRICS & GYNECOLOGY

## 2025-03-12 PROCEDURE — 87591 N.GONORRHOEAE DNA AMP PROB: CPT | Performed by: OBSTETRICS & GYNECOLOGY

## 2025-03-12 PROCEDURE — 87491 CHLMYD TRACH DNA AMP PROBE: CPT | Performed by: OBSTETRICS & GYNECOLOGY

## 2025-03-12 RX ORDER — ONDANSETRON 4 MG/1
4 TABLET, ORALLY DISINTEGRATING ORAL EVERY 8 HOURS PRN
Qty: 20 TABLET | Refills: 2 | Status: SHIPPED | OUTPATIENT
Start: 2025-03-12

## 2025-03-12 NOTE — NURSING NOTE
"Chief Complaint   Patient presents with    Prenatal Care     New Prenatal visit  7 weeks 2 days ?  Labs & U/S done  Pap 24 NIL  G/C due        Initial BP 92/62 (BP Location: Right arm, Cuff Size: Adult Regular)   Wt 59.9 kg (132 lb 1.6 oz)   LMP 2025 (Exact Date)   BMI 20.09 kg/m   Estimated body mass index is 20.09 kg/m  as calculated from the following:    Height as of 10/30/24: 1.727 m (5' 8\").    Weight as of this encounter: 59.9 kg (132 lb 1.6 oz).  BP completed using cuff size: regular    Questioned patient about current smoking habits.  Pt. has never smoked.    7w2d      The following HM Due: NONE        +Nausea         Jessica Beckford, CMA on 3/12/2025 at 9:39 AM   "

## 2025-03-12 NOTE — PROGRESS NOTES
This is a 19 year old female patient,   who presents for her first obstetrical visit. This pregnancy is Planned, Desired.    EDC Oct 27, 2025 by Previous US which makes her 9w2d  today.  Her cycles are irregular.  Her last menstrual period was normal. Since her LMP, she has experienced  nausea.  She denies vaginal discharge, pelvic pain, and vaginal bleeding. Ultrasound in the 1st trimester showed EDC inconsistent with dates by LMP.     OB History    Para Term  AB Living   1 0 0 0 0 0   SAB IAB Ectopic Multiple Live Births   0 0 0 0 0      # Outcome Date GA Lbr Tom/2nd Weight Sex Type Anes PTL Lv   1 Current                History of GDM: No,  PTL : No,  History of HTN in pregnancy: No,  Thrombocytopenia: No,  Shoulder dystocia: No,  Vacuum Extraction: No  PPH: No   3rd of 4th degree laceration: No.   Other complications: No      Since her last LMP she denies use of alcohol, tobacco and street drugs.    HISTORY:  Past Medical History:   Diagnosis Date    Clavicle fracture 2007    Concussion with loss of consciousness     Approx 18 mos    Constipation      Miralax;  Enulose    Polycystic ovary syndrome     Sever's disease 2011    Right  foot     Past Surgical History:   Procedure Laterality Date    HIP SURGERY  2019    torn labrum     Family History   Problem Relation Age of Onset    Other - See Comments Mother         Bilat hip replacement    Migraines Mother     No Known Problems Father     No Known Problems Maternal Grandmother     No Known Problems Maternal Grandfather     No Known Problems Paternal Grandmother     No Known Problems Paternal Grandfather     No Known Problems Brother     No Known Problems Brother      Social History     Socioeconomic History    Marital status:      Spouse name: Eric Patrick    Number of children: None    Years of education: None    Highest education level: Associate degree: academic program   Occupational History    Occupation:  medical records     Comment: [employer]   Tobacco Use    Smoking status: Never    Smokeless tobacco: Never   Vaping Use    Vaping status: Never Used   Substance and Sexual Activity    Alcohol use: No     Alcohol/week: 0.0 standard drinks of alcohol    Drug use: No    Sexual activity: Not Currently     Partners: Male     Current Outpatient Medications   Medication Sig Dispense Refill    Prenatal Vit-Fe Fumarate-FA (PRENATAL MULTIVITAMIN W/IRON) 27-0.8 MG tablet Take 1 tablet by mouth daily. 90 tablet      No current facility-administered medications for this visit.     No Known Allergies    Past medical, surgical, social and family history were reviewed and updated in EPIC.    ROS:   12 point review of systems negative other than symptoms noted below.    EXAM:  BP 92/62 (BP Location: Right arm, Cuff Size: Adult Regular)   Wt 59.9 kg (132 lb 1.6 oz)   LMP 01/06/2025 (Exact Date)   BMI 20.09 kg/m     BMI: Body mass index is 20.09 kg/m .    EXAM:  Constitutional: Appearance: Well nourished, well developed alert, in no acute distress  Chest:  Respiratory Effort:  Breathing unlabored  Cardiovascular:Heart    Auscultation:  Regular rate, normal rhythm, no murmurs present  Gastrointestinal:  Abdominal Examination:  Abdomen nontender to palpation, tone normal without     rigidity or guarding, no masses present, umbilicus without lesions    Liver and speen:  No hepatomegaly present, liver nontender to palpation    Hernias:  No hernias present    FHTs not checked due to early EGA and U/S showing FHTs 2 days ago.  Skin:  General Inspection:  No rashes present, no lesions present, no areas of  discoloration.  Neurologic/Psychiatric:    Mental Status:  Oriented X3       Pelvis: External genitalia, Bartholin, urethral, and Onamia glands within normal limits. Urethra is without lesion and nontender to palpation. Bladder is nontender. On speculum exam, cervix is without lesion and vagina is normal without lesion or discharge. Pap smear  [Disease: _____________________] : Disease: [unfilled] not due until 5/2027, GC/Chlam done.    ASSESSMENT:      ICD-10-CM    1. Encounter for supervision of normal first pregnancy in first trimester  Z34.01       2. Screen for STD (sexually transmitted disease)  Z11.3 NEISSERIA GONORRHOEA PCR     CHLAMYDIA TRACHOMATIS PCR      3. Nausea and vomiting during pregnancy  O21.9 ondansetron (ZOFRAN ODT) 4 MG ODT tab          PLAN:    Prenatal labs reviewed. She has no questions.    GC/Chlam sent.    Rx Zofran for N/V.  Can also try crystallized ginger, Butter Rum Lifesavers, real ginger ale.    Discussed options for screening for and diagnosis of chromosomal anomalies, including first trimester screen, noninvasive prenatal testing/cell-free fetal DNA testing, CVS/amniocentesis, quad screen, and ultrasound or comprehensive Level II US at 18-20 weeks. She is electing noninvasive prenatal testing at 10+ wks, MsAFP at 16 wks, and U/S at 20 wks.    Reviewed early pregnancy education, diet, exercise, prenatal vitamins, intercourse. Reviewed the call schedule, labor and delivery, and the schedule of prenatal visits.    RTC 5 weeks. She is encouraged to call sooner with questions or concerns.      Norberto Moon MD  [facility]       [T: ___] : T[unfilled] [de-identified] : HER2 positive [de-identified] : Patient returns for follow up. \par Doing well very well. No complaints. No pain. She has lost 14 lbs since last visit. Exercising on a stationary bike. \par No fevers, chills, or night sweats. No N/V/D/C. No SOB or DESAI.\par Denies any pain or leg swelling. \par \par Blood work taken by Dr. Luevano on April 20 2019:\par Hepatic function panel:\par ALT 14, Albumin 4.4, AST 17, Bilirubin- direct 0.1, Bilirubin- indirect 0.5, total bilirubin 0.6, total protein 6.9, ALK phos 78. \par Vitamin D 28\par Mg 1.6\par BMP: Calcium 9.7, CO2 30, Chloride 101, Creatinine 0.79, potassium 3.8, sodium 142, glucose 94, BUN 19,  [de-identified] : Ms. Young was diagnosed with HER2 positive right breast cancer at age 52 in August 2017.\par \par She initially presented with right nipple yellowish discharge.  Subsequent mammogram and ultrasound on 7/27/17 showed new spiculated mass in inner central posterior right breast with sonographic confirmation of suspicious mass at 3-4:00 region, and an indeterminate hypoechoic nodule at 1:00 right breast.\par \par On 8/3/17 she had right breast core biopsy of 3-4:00 mass --> pathology showed invasive poorly differentiated ductal carcinoma, Enzo grade 8/9, 0.9 cm--> ER-, MO-, HER2 IHC 3+ (positive)\par Right breast 1:00 --> fibrocystic change, proliferative type with nodular sclerosing adenosis\par \par 8/21/17 MRI breast -   Biopsy  clip  and  3.8  cm  area  of  irregular  non  mass  enhancement  in  the right  breast  at  the  3-4:00  location  posteriorly,  corresponding  to  recently\par diagnosed  invasive  carcinoma. Additional  irregular  areas  of  enhancement  in  the  right  breast  at  the 2-3:00  location  and  8-9:00  location,  suspicious  for  additional disease. Representative  MR  guided  core  biopsy  of  enhancement  at  the  8-9:00  location can  be  performed  to  evaluate  for  multicentric  disease,  if  it  would  change\par management. 8  mm  enhancing  nodule  demonstrating  washout  kinetics  in  the  upper slightly  inner  mid  left  breast.  MR  guided  core  biopsy  is  advised.\par Clip  artifact  in  the  right  breast  at  the  1:00  location  marking  site  of biopsy-proven  radial  scar.  No  associated  suspicious  enhancement.  Recommend\par appropriate  surgical  management.  2.1  cm  oval  T2  bright  lesion  in  the  liver,  probable  cyst  or  hemangioma.\par Recommend  dedicated  liver  ultrasound  for  further  evaluation.\par \par 8/28/17 MRI Core biopsy right breast lower outer quadrant --> DCIS, solid pattern, high grade atypia, focal necrosis. ER/MO pending\par Core biopsy left breast central inner --> mild proliferative fibrocystic change\par \par Treatment Summary\par 9/21/17 port placement\par 9/20/17 - 1/3/18  TCHP x 6 cycles (9/30/17 - 1/03/18\par 5/03/18 - 11/26/18  Trastuzumab (Herceptin) X every 3 weeks,  to complete 52 weeks.\par She had breast reconstruction on 8/14/18 with nipple reconstruction.\par \par 3/14/18 - She underwent right skin sparing mastectomy with SNL biopsy and left risk reduction mastectomy with BUNNY reconstruction.\par 1- Left breast, mastectomy- Fibrocystic changes\par 2- Right sentinel lymph node, excision- Three negative lymph nodes (0/3)\par 3- Right sentinel lymph node 2, excision- One negative lymph node (0/1)\par 4- Right breast, mastectomy- No residual invasive carcinoma (s/p neoadjuvant treatment)- No residual DCIS (s/p neoadjuvant treatment)\par  Pathologic staging: ypT0, ypN0\par \par

## 2025-03-13 LAB
C TRACH DNA SPEC QL NAA+PROBE: NEGATIVE
N GONORRHOEA DNA SPEC QL NAA+PROBE: NEGATIVE
SPECIMEN TYPE: NORMAL
SPECIMEN TYPE: NORMAL

## 2025-04-14 ENCOUNTER — PRENATAL OFFICE VISIT (OUTPATIENT)
Dept: OBGYN | Facility: CLINIC | Age: 20
End: 2025-04-14
Payer: COMMERCIAL

## 2025-04-14 VITALS
HEART RATE: 98 BPM | DIASTOLIC BLOOD PRESSURE: 58 MMHG | SYSTOLIC BLOOD PRESSURE: 96 MMHG | WEIGHT: 135.1 LBS | BODY MASS INDEX: 20.54 KG/M2 | OXYGEN SATURATION: 100 %

## 2025-04-14 DIAGNOSIS — Z34.01 ENCOUNTER FOR SUPERVISION OF NORMAL FIRST PREGNANCY IN FIRST TRIMESTER: Primary | ICD-10-CM

## 2025-04-14 PROCEDURE — 3078F DIAST BP <80 MM HG: CPT | Performed by: OBSTETRICS & GYNECOLOGY

## 2025-04-14 PROCEDURE — 99207 PR PRENATAL VISIT: CPT | Performed by: OBSTETRICS & GYNECOLOGY

## 2025-04-14 PROCEDURE — 0502F SUBSEQUENT PRENATAL CARE: CPT | Performed by: OBSTETRICS & GYNECOLOGY

## 2025-04-14 PROCEDURE — 36415 COLL VENOUS BLD VENIPUNCTURE: CPT | Performed by: OBSTETRICS & GYNECOLOGY

## 2025-04-14 PROCEDURE — 3074F SYST BP LT 130 MM HG: CPT | Performed by: OBSTETRICS & GYNECOLOGY

## 2025-04-14 RX ORDER — SERTRALINE HYDROCHLORIDE 25 MG/1
1 TABLET, FILM COATED ORAL
COMMUNITY
Start: 2024-04-20

## 2025-04-14 NOTE — NURSING NOTE
"Chief Complaint   Patient presents with    Prenatal Care     12 weeks 0 days   NIPT today        Initial BP 96/58 (BP Location: Right arm, Cuff Size: Adult Regular)   Wt 61.3 kg (135 lb 1.6 oz)   LMP 2025 (Exact Date)   BMI 20.54 kg/m   Estimated body mass index is 20.54 kg/m  as calculated from the following:    Height as of 10/30/24: 1.727 m (5' 8\").    Weight as of this encounter: 61.3 kg (135 lb 1.6 oz).  BP completed using cuff size: regular    Questioned patient about current smoking habits.  Pt. has never smoked.    12w0d       The following HM Due: NONE        + Light headed & dizzy   +Feels like heart racing   NIPT today         Jessica Beckford, CMA on 2025 at 8:02 AM         "

## 2025-04-14 NOTE — PROGRESS NOTES
No c/o's. NIPT today. MsAFP at next visit. RTC 4 weeks.    Encounter Diagnosis   Name Primary?    Encounter for supervision of normal first pregnancy in first trimester Yes       Risk factors listed above are stable and being addressed as noted.    Norberto Moon MD  [facility] [place]

## 2025-04-22 LAB — SCANNED LAB RESULT: NORMAL

## 2025-04-28 ENCOUNTER — TELEPHONE (OUTPATIENT)
Dept: OBGYN | Facility: CLINIC | Age: 20
End: 2025-04-28
Payer: COMMERCIAL

## 2025-04-28 NOTE — TELEPHONE ENCOUNTER
Call to pt.     Has prenatal visit scheduled at 16 weeks with Dr Terry. Pt advised that order for anatomy scan will be placed at this visit. She can then schedule anatomy scan for around 18-20 weeks.    Pt agreeable.    Valentina TOSCANO RN  OB/GYN [place]

## 2025-04-28 NOTE — TELEPHONE ENCOUNTER
[facility] Call Center    Phone Message    May a detailed message be left on voicemail: yes     Reason for Call: Other: Pt requesting anatomy scan orders for US and call back for confirmation when able. Please review and contact pt.     Action Taken: Other: [facility] [id number]     Travel Screening: Not Applicable     Date of Service:

## 2025-04-29 ENCOUNTER — NURSE TRIAGE (OUTPATIENT)
Dept: NURSING | Facility: CLINIC | Age: 20
End: 2025-04-29
Payer: COMMERCIAL

## 2025-04-30 NOTE — TELEPHONE ENCOUNTER
"   OB Triage Call      Is patient's OB/Midwife with the [facility] or [facility]? [facility]- Proceed with triage     Reason for call: Pregnant 14 w 1 d  For the past few weeks I have had really bad tooth pain. I called my DDS and was told that they do not want to see me until I am further along in my pregnancy    Assessment: Tonight my tooth fell out: upper tooth just before molars begin. It broke apart, there is still part remaining in my mouth. The back half fell off. It still hurts, currently =\"4\". Sensitive to cold. I took Tylenol once, it did not help. No fever or facial swelling noted.     Plan: Recommended to contact OBGYN provider in am and discuss if having dental work done while pregnant will be OK, then she will contact DDS for further care.     Patient plans to deliver at  N/A    Patient's primary OB Provider is OBGYN=Dr Norberto Moon @ [facility] location.      Per protocol recommendations Patient to schedule follow up appointment within tomorrow.      Is patient's delivering hospital on divert? Does not apply due to Patient to schedule appointment       Carmen Church RN Triage Nurse Advisor 8:58 PM 2025    14w1d    Estimated Date of Delivery: Oct 27, 2025        OB History    Para Term  AB Living   1 0 0 0 0 0   SAB IAB Ectopic Multiple Live Births   0 0 0 0 0      # Outcome Date GA Lbr Tom/2nd Weight Sex Type Anes PTL Lv   1 Current                No results found for: \"GBS\"         Reason for Disposition   Toothache present > 24 hours    Additional Information   Negative: Shock suspected (e.g., cold/pale/clammy skin, too weak to stand, low BP, rapid pulse)   Negative: [1] Similar pain previously AND [2] it was from \"heart attack\"   Negative: [1] Similar pain previously AND [2] it was from \"angina\" AND [3] not relieved by nitroglycerin   Negative: Sounds like a life-threatening emergency to the triager   Negative: Chest pain   Negative: Toothache followed tooth injury   " Negative: Toothache or mouth pain after tooth extraction   Negative: Canker sore (i.e., aphthous ulcer)   Negative: Tongue is very swollen and tender   Negative: [1] Face is swollen AND [2] fever   Negative: Patient sounds very sick or weak to the triager   Negative: [1] SEVERE pain (e.g., excruciating, unable to eat, unable to do any normal activities) AND [2] not improved 2 hours after pain medicine   Negative: Face is very swollen   Negative: Fever   Negative: [1] Face is swollen AND [2] no fever    Protocols used: Toothache-A-AH

## 2025-05-19 ENCOUNTER — PRENATAL OFFICE VISIT (OUTPATIENT)
Dept: OBGYN | Facility: CLINIC | Age: 20
End: 2025-05-19
Payer: COMMERCIAL

## 2025-05-19 VITALS — WEIGHT: 135.6 LBS | SYSTOLIC BLOOD PRESSURE: 118 MMHG | BODY MASS INDEX: 20.62 KG/M2 | DIASTOLIC BLOOD PRESSURE: 68 MMHG

## 2025-05-19 DIAGNOSIS — Z34.01 ENCOUNTER FOR SUPERVISION OF NORMAL FIRST PREGNANCY IN FIRST TRIMESTER: Primary | ICD-10-CM

## 2025-05-19 PROCEDURE — 3074F SYST BP LT 130 MM HG: CPT | Performed by: FAMILY MEDICINE

## 2025-05-19 PROCEDURE — 3078F DIAST BP <80 MM HG: CPT | Performed by: FAMILY MEDICINE

## 2025-05-19 PROCEDURE — 99207 PR PRENATAL VISIT: CPT | Performed by: FAMILY MEDICINE

## 2025-05-19 PROCEDURE — 36415 COLL VENOUS BLD VENIPUNCTURE: CPT | Performed by: FAMILY MEDICINE

## 2025-05-19 PROCEDURE — 82105 ALPHA-FETOPROTEIN SERUM: CPT | Mod: 90 | Performed by: FAMILY MEDICINE

## 2025-05-19 PROCEDURE — 0502F SUBSEQUENT PRENATAL CARE: CPT | Performed by: FAMILY MEDICINE

## 2025-05-19 PROCEDURE — 99000 SPECIMEN HANDLING OFFICE-LAB: CPT | Performed by: FAMILY MEDICINE

## 2025-05-19 NOTE — PROGRESS NOTES
CC: Here for routine prenatal visit   20 year old y/o  @ 17w0d with Estimated Date of Delivery: Oct 27, 2025     /68   Wt 61.5 kg (135 lb 9.6 oz)   LMP 2025 (Exact Date)   BMI 20.62 kg/m    See OB flowsheet  + fetal movement, no contractions, no bleeding, no loss of fluid   Discussed monitoring fetal movement     1) concerns:   2) Routine: Blood type O+ RI tdap [ ] flu [dec ] GS [nl] NIPT [nl, XX] AFP [nl]   Covid v [dec, has not had] gtt [ ] RSV [ ] GBS [ ]  3) Risk factors:   A: hx of Eating d/o, has a metaphobia, fear of throwing up:  so has small meals to prevent vomiting,    Rx zofran, will check serial growth us 28 and 34 weeks, discussed adding in snacks through out the day.    Previously took medication for anxiety, which helped but not significantly, and so she stopped it    4) Return: 4 weeks     [facility]

## 2025-05-19 NOTE — NURSING NOTE
"17w0d    Chief Complaint   Patient presents with    Prenatal Care     Discuss calorie intake--wonders if she is getting enough     initial /68   Wt 61.5 kg (135 lb 9.6 oz)   LMP 01/06/2025 (Exact Date)   BMI 20.62 kg/m   Estimated body mass index is 20.62 kg/m  as calculated from the following:    Height as of 10/30/24: 1.727 m (5' 8\").    Weight as of this encounter: 61.5 kg (135 lb 9.6 oz).  BP completed using cuff size janelle Wells CMA on 5/19/2025 at 10:21 AM    "

## 2025-05-19 NOTE — PATIENT INSTRUCTIONS
"Return 4 weeks, schedule us, NICOLE today   Return to clinic:  every 4 weeks till 28 weeks, then every 2 weeks till 36 weeks, then weekly till delivery  Please schedule out a few ob visits at a time so that you can get an appointment as you would like.     For tour call [phone], tours on Thursdays @ 4:30 and 5:30 pm    Phone numbers Bhargavi:  Day/ night [phone] ask for provider with buttons  Emergency:  call [phone] and push button asking for immediate assistance    What should I call about??    Contraction every 5 minutes for 1 hour 1 minute long (511), bleeding, loss of fluid, headache that doesn't resolve with tylenol, and decreased fetal movement     Start kick counts @ 26-28 weeks   There is an myra for this!  It is called \"count the kicks\"  Keep track of movement and discover your normal baby movement pattern   guideline is listed below  Please call if you do not feel the baby move!  We will have you come in for fetal heart rate monitoring:   Perception of at least 10 FMs during 12 hours of normal maternal activity   Perception of least 10 FMs over two hours when the mother is at rest and focused on [facility] Address   [address], [place] [address] [phone]    Dr. Ernestine Terry, DO    OB/GYN   [facility]                                                    "

## 2025-05-20 LAB
# FETUSES US: NORMAL
AFP MOM SERPL: 0.86
AFP SERPL-MCNC: 36 NG/ML
AGE - REPORTED: 22.5 YR
CURRENT SMOKER: NO
FAMILY MEMBER DISEASES HX: NO
GA METHOD: NORMAL
GA: NORMAL WK
IDDM PATIENT QL: NO
INTEGRATED SCN PATIENT-IMP: NORMAL
SPECIMEN DRAWN SERPL: NORMAL

## 2025-05-21 ENCOUNTER — RESULTS FOLLOW-UP (OUTPATIENT)
Dept: OBGYN | Facility: CLINIC | Age: 20
End: 2025-05-21

## 2025-06-01 ENCOUNTER — OFFICE VISIT (OUTPATIENT)
Dept: URGENT CARE | Facility: URGENT CARE | Age: 20
End: 2025-06-01
Payer: COMMERCIAL

## 2025-06-01 ENCOUNTER — NURSE TRIAGE (OUTPATIENT)
Dept: NURSING | Facility: CLINIC | Age: 20
End: 2025-06-01
Payer: COMMERCIAL

## 2025-06-01 VITALS
DIASTOLIC BLOOD PRESSURE: 66 MMHG | WEIGHT: 140 LBS | HEART RATE: 67 BPM | HEIGHT: 67 IN | BODY MASS INDEX: 21.97 KG/M2 | OXYGEN SATURATION: 100 % | TEMPERATURE: 98 F | SYSTOLIC BLOOD PRESSURE: 105 MMHG | RESPIRATION RATE: 16 BRPM

## 2025-06-01 DIAGNOSIS — M54.50 ACUTE LOW BACK PAIN, UNSPECIFIED BACK PAIN LATERALITY, UNSPECIFIED WHETHER SCIATICA PRESENT: Primary | ICD-10-CM

## 2025-06-01 LAB
ALBUMIN UR-MCNC: NEGATIVE MG/DL
AMORPH CRY #/AREA URNS HPF: ABNORMAL /HPF
APPEARANCE UR: ABNORMAL
BACTERIA #/AREA URNS HPF: ABNORMAL /HPF
BILIRUB UR QL STRIP: NEGATIVE
COLOR UR AUTO: YELLOW
GLUCOSE UR STRIP-MCNC: NEGATIVE MG/DL
HGB UR QL STRIP: NEGATIVE
KETONES UR STRIP-MCNC: ABNORMAL MG/DL
LEUKOCYTE ESTERASE UR QL STRIP: ABNORMAL
NITRATE UR QL: NEGATIVE
PH UR STRIP: 7 [PH] (ref 5–7)
RBC #/AREA URNS AUTO: ABNORMAL /HPF
SP GR UR STRIP: 1.02 (ref 1–1.03)
SQUAMOUS #/AREA URNS AUTO: ABNORMAL /LPF
UROBILINOGEN UR STRIP-ACNC: 4 E.U./DL
WBC #/AREA URNS AUTO: ABNORMAL /HPF

## 2025-06-01 PROCEDURE — 81001 URINALYSIS AUTO W/SCOPE: CPT | Performed by: FAMILY MEDICINE

## 2025-06-01 PROCEDURE — 87086 URINE CULTURE/COLONY COUNT: CPT | Performed by: FAMILY MEDICINE

## 2025-06-01 PROCEDURE — 99214 OFFICE O/P EST MOD 30 MIN: CPT | Performed by: FAMILY MEDICINE

## 2025-06-01 PROCEDURE — 3074F SYST BP LT 130 MM HG: CPT | Performed by: FAMILY MEDICINE

## 2025-06-01 PROCEDURE — 3078F DIAST BP <80 MM HG: CPT | Performed by: FAMILY MEDICINE

## 2025-06-01 RX ORDER — NITROFURANTOIN 25; 75 MG/1; MG/1
100 CAPSULE ORAL 2 TIMES DAILY
Qty: 14 CAPSULE | Refills: 0 | Status: CANCELLED | OUTPATIENT
Start: 2025-06-01 | End: 2025-06-08

## 2025-06-01 RX ORDER — CEPHALEXIN 250 MG/5ML
500 POWDER, FOR SUSPENSION ORAL 2 TIMES DAILY
Qty: 140 ML | Refills: 0 | Status: SHIPPED | OUTPATIENT
Start: 2025-06-01 | End: 2025-06-08

## 2025-06-01 NOTE — PROGRESS NOTES
Urgent Care Clinic Visit    Chief Complaint   Patient presents with    Urgent Care     19 week pregnant and is having lower back pain x 2 days. Started 2 nights ago , lower left side pain off and on . Having blood sugar issues? Feels shaky and wondering if blood sugar issues.                6/1/2025     9:08 AM   Additional Questions   Roomed by LS   Accompanied by

## 2025-06-01 NOTE — PROGRESS NOTES
SUBJECTIVE:  Chief Complaint   Patient presents with    Urgent Care     19 week pregnant and is having lower back pain x 2 days. Started 2 nights ago , lower left side pain off and on . Having blood sugar issues? Feels shaky and wondering if blood sugar issues.      Fabienne Sheehan is a 20 year old female who presents with a chief complaint of lower back pain X 2 days, currently 18 wks pregnant    Reviewed triage nurse call this morning:  Reason for call: flank pain  Assessment: Call from patient with concerns that 2 nights ago she had R side lower back pain . It developed again last night at 2300, she states that the pain is a 7/10. Patient denies fever, urinary concerns. Patient has not taken any medications for treatment of pain as of time of call     Plan:  present to  for evaluation    Is able to drink more water now.  Intermittent back pain for the past 2 days.  Feeling shaky, concern about blood sugar issue.    Will be going out of town tomorrow to [place] for 1 week      Past Medical History:   Diagnosis Date    Clavicle fracture 01/01/2007    Concussion with loss of consciousness     Approx 18 mos    Constipation     9/06 Miralax; 5/08 Enulose    Polycystic ovary syndrome     Sever's disease 05/01/2011    Right  foot     Current Outpatient Medications   Medication Sig Dispense Refill    Prenatal Vit-Fe Fumarate-FA (PRENATAL MULTIVITAMIN W/IRON) 27-0.8 MG tablet Take 1 tablet by mouth daily. 90 tablet     ondansetron (ZOFRAN ODT) 4 MG ODT tab Take 1 tablet (4 mg) by mouth every 8 hours as needed for nausea. (Patient not taking: Reported on 4/14/2025) 20 tablet 2    sertraline (ZOLOFT) 25 MG tablet Take 1 tablet by mouth daily at 2 pm. (Patient not taking: Reported on 4/14/2025)       Social History     Tobacco Use    Smoking status: Never    Smokeless tobacco: Never   Substance Use Topics    Alcohol use: No     Alcohol/week: 0.0 standard drinks of alcohol       ROS:  Review of systems negative except as  "stated above.    EXAM:   /66   Pulse 67   Temp 98  F (36.7  C) (Oral)   Resp 16   Ht 1.702 m (5' 7\")   Wt 63.5 kg (140 lb)   LMP 01/06/2025 (Exact Date)   SpO2 100%   BMI 21.93 kg/m    GENERAL APPEARANCE: healthy, alert and no distress  PSYCH:alert, affect bright    Results for orders placed or performed in visit on 06/01/25   UA Macroscopic with reflex to Microscopic and Culture - Lab Collect     Status: Abnormal    Specimen: Urine, Midstream   Result Value Ref Range    Color Urine Yellow Colorless, Straw, Light Yellow, Yellow    Appearance Urine Slightly Cloudy (A) Clear    Glucose Urine Negative Negative mg/dL    Bilirubin Urine Negative Negative    Ketones Urine Trace (A) Negative mg/dL    Specific Gravity Urine 1.020 1.003 - 1.035    Blood Urine Negative Negative    pH Urine 7.0 5.0 - 7.0    Protein Albumin Urine Negative Negative mg/dL    Urobilinogen Urine 4.0 (A) 0.2, 1.0 E.U./dL    Nitrite Urine Negative Negative    Leukocyte Esterase Urine Trace (A) Negative   UA Microscopic with Reflex to Culture     Status: Abnormal   Result Value Ref Range    Bacteria Urine Few (A) None Seen /HPF    RBC Urine 0-2 0-2 /HPF /HPF    WBC Urine 10-25 (A) 0-5 /HPF /HPF    Squamous Epithelials Urine Moderate (A) None Seen /LPF    Amorphous Crystals Urine Many (A) None Seen /HPF         ASSESSMENT/PLAN:  (M54.50) Acute low back pain, unspecified back pain laterality, unspecified whether sciatica present  (primary encounter diagnosis)  Plan: UA Macroscopic with reflex to Microscopic and         Culture - Lab Collect, UA Microscopic with         Reflex to Culture, Urine Culture, Urine Culture        Aerobic Bacterial - lab collect, cephALEXin         (KEFLEX) 250 MG/5ML suspension            Reviewed labs, empiric treatment for presumptive UTI due to pregnancy, RX keflex given.  Will follow up on formal xray report and notify if any abnormalities.  Encourage to drink plenty of fluids.  Discussed possible " musculoskeletal etiology, okay for tylenol for discomfort.    Recommend follow up with OB provider in 1 week    Ottoniel Argueta MD  June 1, 2025 10:01 AM

## 2025-06-01 NOTE — TELEPHONE ENCOUNTER
"    OB Triage Call      Is patient's OB/Midwife with the [facility]? [facility]- Proceed with triage     Reason for call: flank pain    Assessment: Call from patient with concerns that 2 nights ago she had R side lower back pain . It developed again last night at 2300, she states that the pain is a 7/10. Patient denies fever, urinary concerns. Patient has not taken any medications for treatment of pain as of time of call    Plan:  present to  for evaluation    Patient plans to deliver at      Patient's primary OB Provider is fredrick HAILE .      Per protocol recommendations  patient to present to  for evaluation    Is patient's delivering hospital on divert? Does not apply due to patient to present to .       18w6d    Estimated Date of Delivery: Oct 27, 2025        OB History    Para Term  AB Living   1 0 0 0 0 0   SAB IAB Ectopic Multiple Live Births   0 0 0 0 0      # Outcome Date GA Lbr Tom/2nd Weight Sex Type Anes PTL Lv   1 Current                No results found for: \"GBS\"       Ambreen Castellanos RN   Reason for Disposition   Flank pain  (Exception: Pain that is only present with movement.)    Additional Information   Negative: Shock suspected (e.g., cold/pale/clammy skin, too weak to stand, low BP, rapid pulse)   Negative: SEVERE abdominal pain   Negative: Sounds like a life-threatening emergency to the triager   Negative: Major injury to the back (e.g., MVA, fall > 10 feet or 3 meters, penetrating injury, etc.)   Negative: Followed a fall   Negative: Followed a tailbone injury   Negative: [1] Having contractions or other symptoms of labor AND [2] 37 or more weeks pregnant (i.e., term pregnancy)   Negative: [1] Having contractions or other symptoms of labor AND [2] < 37 weeks pregnant (i.e., )   Negative: [1] Abdominal pain AND [2] pregnant 20 or more weeks   Negative: [1] Abdominal pain AND [2] pregnant < 20 weeks   Negative: [1] Pain in the upper back AND [2] " overlies the rib cage   Negative: [1] Pain in the upper back AND [2] worsened by coughing (or clearly increases with breathing)   Negative: [1] Unable to urinate (or only a few drops) > 4 hours AND [2] bladder feels very full (e.g., palpable bladder or strong urge to urinate)   Negative: Numbness in groin or rectal area (i.e., loss of sensation)   Negative: Leakage of fluid from vagina   Negative: [1] Pregnant 23 or more weeks AND [2] baby is moving less today (e.g., kick count < 5 in 1 hour or < 10 in 2 hours)   Negative: [1] Fever 100.4 F (38.0 C) or higher AND [2] flank pain (i.e., in side, below ribs and above hip)   Negative: Weakness of a leg or foot (e.g., unable to bear weight, dragging foot)   Negative: Unable to walk   Negative: Patient sounds very sick or weak to the triager   Negative: [1] SEVERE back pain (e.g., excruciating, unable to do any normal activities) AND [2] not improved 2 hours after pain medicine   Negative: [1] Pain radiates into the thigh or further down the leg AND [2] both legs   Negative: Pain or burning with passing urine (urination)   Negative: Numbness in a leg or foot (i.e., loss of sensation)   Negative: High-risk adult (e.g., history of cancer, HIV, or IV drug use)    Protocols used: Pregnancy - Back Pain-A-AH

## 2025-06-02 LAB — BACTERIA UR CULT: NORMAL

## 2025-06-11 ENCOUNTER — RESULTS FOLLOW-UP (OUTPATIENT)
Dept: OBGYN | Facility: CLINIC | Age: 20
End: 2025-06-11

## 2025-06-11 ENCOUNTER — PRENATAL OFFICE VISIT (OUTPATIENT)
Dept: OBGYN | Facility: CLINIC | Age: 20
End: 2025-06-11
Payer: COMMERCIAL

## 2025-06-11 ENCOUNTER — ANCILLARY PROCEDURE (OUTPATIENT)
Dept: ULTRASOUND IMAGING | Facility: CLINIC | Age: 20
End: 2025-06-11
Attending: FAMILY MEDICINE
Payer: COMMERCIAL

## 2025-06-11 VITALS — SYSTOLIC BLOOD PRESSURE: 100 MMHG | BODY MASS INDEX: 22.08 KG/M2 | WEIGHT: 141 LBS | DIASTOLIC BLOOD PRESSURE: 54 MMHG

## 2025-06-11 DIAGNOSIS — Z34.01 ENCOUNTER FOR SUPERVISION OF NORMAL FIRST PREGNANCY IN FIRST TRIMESTER: Primary | ICD-10-CM

## 2025-06-11 DIAGNOSIS — Z34.01 ENCOUNTER FOR SUPERVISION OF NORMAL FIRST PREGNANCY IN FIRST TRIMESTER: ICD-10-CM

## 2025-06-11 DIAGNOSIS — Z34.92 NORMAL PREGNANCY, SECOND TRIMESTER: ICD-10-CM

## 2025-06-11 LAB
ALBUMIN UR-MCNC: NEGATIVE MG/DL
AMORPH CRY #/AREA URNS HPF: ABNORMAL /HPF
APPEARANCE UR: ABNORMAL
BACTERIA #/AREA URNS HPF: ABNORMAL /HPF
BILIRUB UR QL STRIP: NEGATIVE
COLOR UR AUTO: YELLOW
GLUCOSE UR STRIP-MCNC: NEGATIVE MG/DL
HGB UR QL STRIP: NEGATIVE
KETONES UR STRIP-MCNC: NEGATIVE MG/DL
LEUKOCYTE ESTERASE UR QL STRIP: ABNORMAL
NITRATE UR QL: NEGATIVE
PH UR STRIP: 7.5 [PH] (ref 5–7)
RBC #/AREA URNS AUTO: ABNORMAL /HPF
SP GR UR STRIP: 1.01 (ref 1–1.03)
SQUAMOUS #/AREA URNS AUTO: ABNORMAL /LPF
UROBILINOGEN UR STRIP-ACNC: 1 E.U./DL
WBC #/AREA URNS AUTO: ABNORMAL /HPF

## 2025-06-11 PROCEDURE — 76805 OB US >/= 14 WKS SNGL FETUS: CPT | Performed by: OBSTETRICS & GYNECOLOGY

## 2025-06-11 PROCEDURE — 99207 PR PRENATAL VISIT: CPT | Performed by: OBSTETRICS & GYNECOLOGY

## 2025-06-11 PROCEDURE — 81001 URINALYSIS AUTO W/SCOPE: CPT | Performed by: OBSTETRICS & GYNECOLOGY

## 2025-06-11 PROCEDURE — 0502F SUBSEQUENT PRENATAL CARE: CPT | Performed by: OBSTETRICS & GYNECOLOGY

## 2025-06-11 PROCEDURE — 3078F DIAST BP <80 MM HG: CPT | Performed by: OBSTETRICS & GYNECOLOGY

## 2025-06-11 PROCEDURE — 3074F SYST BP LT 130 MM HG: CPT | Performed by: OBSTETRICS & GYNECOLOGY

## 2025-06-11 NOTE — NURSING NOTE
"Chief Complaint   Patient presents with    Prenatal Care     20w2d     initial /54   Wt 64 kg (141 lb)   LMP 01/06/2025 (Exact Date)   BMI 22.08 kg/m   Estimated body mass index is 22.08 kg/m  as calculated from the following:    Height as of 6/1/25: 1.702 m (5' 7\").    Weight as of this encounter: 64 kg (141 lb).  BP completed using cuff size regular      Follow up from UC visit on 6/1  Wondering if her blood sugars are getting too low as she had had a couple episodes of fatigue, shaking, nausea- gets better after eating    +FM daily    Caty Parsons CMA on 6/11/2025 at 11:39 AM    "

## 2025-06-11 NOTE — PROGRESS NOTES
Fabienne is seen for routine prenatal care at 20+2 weeks gest    US done but not read  Pt with history of right sided pain, UA no growth, UA suspicious of UTI, Will repeat the UA  No new issues  ROS neg    RTC one month

## 2025-07-08 ENCOUNTER — PRENATAL OFFICE VISIT (OUTPATIENT)
Dept: OBGYN | Facility: CLINIC | Age: 20
End: 2025-07-08
Payer: COMMERCIAL

## 2025-07-08 VITALS — DIASTOLIC BLOOD PRESSURE: 60 MMHG | BODY MASS INDEX: 22.98 KG/M2 | WEIGHT: 146.7 LBS | SYSTOLIC BLOOD PRESSURE: 98 MMHG

## 2025-07-08 DIAGNOSIS — Z34.02 ENCOUNTER FOR SUPERVISION OF NORMAL FIRST PREGNANCY IN SECOND TRIMESTER: Primary | ICD-10-CM

## 2025-07-08 PROCEDURE — 0502F SUBSEQUENT PRENATAL CARE: CPT | Performed by: OBSTETRICS & GYNECOLOGY

## 2025-07-08 PROCEDURE — 3074F SYST BP LT 130 MM HG: CPT | Performed by: OBSTETRICS & GYNECOLOGY

## 2025-07-08 PROCEDURE — 99207 PR PRENATAL VISIT: CPT | Performed by: OBSTETRICS & GYNECOLOGY

## 2025-07-08 PROCEDURE — 3078F DIAST BP <80 MM HG: CPT | Performed by: OBSTETRICS & GYNECOLOGY

## 2025-07-08 NOTE — PROGRESS NOTES
No c/o's. 1hr Glu, Hgb, RPR, TDaP at next visit.  labor/Premature rupture of membranes precautions reviewed.  RTC 4 weeks.    Encounter Diagnosis   Name Primary?    Encounter for supervision of normal first pregnancy in second trimester Yes       Risk factors listed above are stable and being addressed as noted.    Norberto Moon MD  [facility]

## 2025-07-08 NOTE — NURSING NOTE
"Chief Complaint   Patient presents with    Prenatal Care     24 weeks 1 day        Initial BP 98/60 (BP Location: Right arm, Cuff Size: Adult Regular)   Wt 66.5 kg (146 lb 11.2 oz)   LMP 2025 (Exact Date)   BMI 22.98 kg/m   Estimated body mass index is 22.98 kg/m  as calculated from the following:    Height as of 25: 1.702 m (5' 7\").    Weight as of this encounter: 66.5 kg (146 lb 11.2 oz).  BP completed using cuff size: regular    Questioned patient about current smoking habits.  Pt. has never smoked.    24w1d       The following HM Due: NONE        +FM Daily         Jessica Beckford, CMA on 2025 at 3:28 PM    "

## 2025-08-22 ENCOUNTER — TELEPHONE (OUTPATIENT)
Dept: OBGYN | Facility: CLINIC | Age: 20
End: 2025-08-22